# Patient Record
Sex: MALE | Race: WHITE | NOT HISPANIC OR LATINO | Employment: OTHER | URBAN - METROPOLITAN AREA
[De-identification: names, ages, dates, MRNs, and addresses within clinical notes are randomized per-mention and may not be internally consistent; named-entity substitution may affect disease eponyms.]

---

## 2019-09-04 ENCOUNTER — HISTORICAL (OUTPATIENT)
Dept: ADMINISTRATIVE | Facility: HOSPITAL | Age: 84
End: 2019-09-04

## 2020-07-16 ENCOUNTER — HISTORICAL (OUTPATIENT)
Dept: ADMINISTRATIVE | Facility: HOSPITAL | Age: 85
End: 2020-07-16

## 2021-06-29 ENCOUNTER — HISTORICAL (OUTPATIENT)
Dept: RADIOLOGY | Facility: HOSPITAL | Age: 86
End: 2021-06-29

## 2021-08-10 ENCOUNTER — HISTORICAL (OUTPATIENT)
Dept: RADIOLOGY | Facility: HOSPITAL | Age: 86
End: 2021-08-10

## 2022-04-09 ENCOUNTER — HISTORICAL (OUTPATIENT)
Dept: ADMINISTRATIVE | Facility: HOSPITAL | Age: 87
End: 2022-04-09

## 2022-04-25 VITALS
BODY MASS INDEX: 28.98 KG/M2 | HEIGHT: 65 IN | DIASTOLIC BLOOD PRESSURE: 86 MMHG | WEIGHT: 173.94 LBS | SYSTOLIC BLOOD PRESSURE: 140 MMHG

## 2022-12-20 DIAGNOSIS — R74.8 ACID PHOSPHATASE ELEVATED: Primary | ICD-10-CM

## 2022-12-23 ENCOUNTER — HOSPITAL ENCOUNTER (OUTPATIENT)
Dept: RADIOLOGY | Facility: HOSPITAL | Age: 87
Discharge: HOME | End: 2022-12-23
Attending: FAMILY MEDICINE
Payer: MEDICARE

## 2022-12-23 DIAGNOSIS — R74.8 ACID PHOSPHATASE ELEVATED: ICD-10-CM

## 2022-12-23 PROCEDURE — 76705 ECHO EXAM OF ABDOMEN: CPT | Mod: TC

## 2023-11-29 ENCOUNTER — HOSPITAL ENCOUNTER (OUTPATIENT)
Dept: RADIOLOGY | Facility: HOSPITAL | Age: 88
Discharge: HOME | End: 2023-11-29
Attending: SURGERY
Payer: MEDICARE

## 2023-11-29 ENCOUNTER — OFFICE VISIT (OUTPATIENT)
Dept: SURGERY | Facility: CLINIC | Age: 88
End: 2023-11-29
Payer: MEDICARE

## 2023-11-29 VITALS
SYSTOLIC BLOOD PRESSURE: 144 MMHG | WEIGHT: 167 LBS | HEIGHT: 65 IN | BODY MASS INDEX: 27.82 KG/M2 | HEART RATE: 76 BPM | DIASTOLIC BLOOD PRESSURE: 84 MMHG

## 2023-11-29 DIAGNOSIS — Z01.818 PREOP EXAMINATION: Primary | ICD-10-CM

## 2023-11-29 DIAGNOSIS — D17.1 LIPOMA OF ANTERIOR CHEST WALL: Primary | ICD-10-CM

## 2023-11-29 DIAGNOSIS — Z01.818 PREOP EXAMINATION: ICD-10-CM

## 2023-11-29 PROCEDURE — 99203 OFFICE O/P NEW LOW 30 MIN: CPT | Mod: ,,, | Performed by: SURGERY

## 2023-11-29 PROCEDURE — 99203 PR OFFICE/OUTPT VISIT, NEW, LEVL III, 30-44 MIN: ICD-10-PCS | Mod: ,,, | Performed by: SURGERY

## 2023-11-29 PROCEDURE — 71045 X-RAY EXAM CHEST 1 VIEW: CPT | Mod: TC

## 2023-11-29 RX ORDER — ATORVASTATIN CALCIUM 40 MG/1
40 TABLET, FILM COATED ORAL
COMMUNITY
Start: 2023-09-11

## 2023-11-29 RX ORDER — AMOXICILLIN 500 MG
CAPSULE ORAL DAILY
COMMUNITY

## 2023-11-29 RX ORDER — UBIDECARENONE 30 MG
30 CAPSULE ORAL 3 TIMES DAILY
COMMUNITY

## 2023-11-29 RX ORDER — BROMPHENIRAMINE MALEATE, DEXTROMETHORPHAN HBR, PHENYLEPHRINE HCL, DIPHENHYDRAMINE HCL, PHENYLEPHRINE HCL 0.52G
0.52 KIT ORAL DAILY
Status: ON HOLD | COMMUNITY
End: 2023-12-14

## 2023-11-29 RX ORDER — DICLOFENAC SODIUM 10 MG/G
GEL TOPICAL
COMMUNITY
Start: 2023-10-06

## 2023-11-29 RX ORDER — NAPROXEN SODIUM 220 MG/1
81 TABLET, FILM COATED ORAL DAILY
COMMUNITY

## 2023-11-29 RX ORDER — SILDENAFIL 100 MG/1
100 TABLET, FILM COATED ORAL DAILY PRN
COMMUNITY

## 2023-11-30 DIAGNOSIS — M79.89 SOFT TISSUE MASS: Primary | ICD-10-CM

## 2023-11-30 RX ORDER — CEFAZOLIN SODIUM 2 G/50ML
2 SOLUTION INTRAVENOUS
Status: CANCELLED | OUTPATIENT
Start: 2023-11-30

## 2023-12-02 NOTE — PROGRESS NOTES
"HISTORY & PHYSICAL  General Surgery    Patient Name: Joanne Akers  YOB: 1935    Date: 12/02/2023                   PRESENTING HISTORY     Chief Complaint/Reason for Admission: Consult (Chest lipoma)       History of Present Illness:  Mr. Joanne Akers is a 88 y.o. male referred for evaluation of mass. He states he  reports a mass on his left chest that has been present for quite some time. It has continued to increase in size and is now causing some discomfort. Denies trauma. He denies chest pain, shortness of breath, fever or chills.    Review of Systems:  12 point ROS negative except as stated in HPI    PAST HISTORY:     Past Medical History:   Diagnosis Date    Diabetes mellitus     Hyperlipidemia     Lipoma     Premature ventricular complex     Prostate cancer      Past Surgical History:   Procedure Laterality Date    arm surgery      CATARACT EXTRACTION      COLONOSCOPY      EYE SURGERY      tongue suregry       Family History   Problem Relation Age of Onset    Diabetes Mother     Heart disease Mother     Heart disease Father      Social History     Socioeconomic History    Marital status:    Tobacco Use    Smoking status: Never     Passive exposure: Never    Smokeless tobacco: Never   Substance and Sexual Activity    Alcohol use: Never     Comment: occassionally       MEDICATIONS & ALLERGIES:     No current outpatient medications on file prior to visit.     No current facility-administered medications on file prior to visit.     Review of patient's allergies indicates:   Allergen Reactions    Codeine Other (See Comments)     "Makes him go crazy"       OBJECTIVE:   Visit Vitals  BP (!) 144/84 (BP Location: Left arm, Patient Position: Sitting)   Pulse 76   Ht 5' 5" (1.651 m)   Wt 75.8 kg (167 lb)   BMI 27.79 kg/m²       Physical Exam:  General:  Well developed, well nourished, no acute distress  HEENT:  Normocephalic, atraumatic, PERRL, EOMI, clear sclera, ears normal, neck supple, " throat clear without erythema or exudates  CVS:  RRR, S1 and S2 normal, no murmurs, rubs, gallops  Resp:  Lungs clear to auscultation, no wheezes, rales, rhonchi, cough  GI:  Abdomen soft, non-tender, non-distended, normoactive bowel sounds, no masses  :  Deferred  MSK:  No muscle atrophy, cyanosis, peripheral edema, full range of motion  Skin:  No rashes, ulcers, erythema. Left Anterior Chest - 10 cm soft tissue mass, NTTP, no erythema / edema  Neuro:  CNII-XII grossly intact  Psych:  Alert and oriented to person, place, and time    Results:  I have independently reviewed all pertinent lab and radiologic studies relevant to general/bariatric surgery.    VISIT DIAGNOSES:       ICD-10-CM ICD-9-CM   1. Lipoma of anterior chest wall  D17.1 214.8       ASSESSMENT/PLAN:     89 yo male with Soft Tissue Mass of Left Anterior Chest    - Plan: Excision of Soft Tissue Mass   - Preoperative workup

## 2023-12-05 NOTE — DISCHARGE INSTRUCTIONS
Patient Education  Surgical Wound Discharge Instructions     What care is needed at home?   Any cut made on the skin gives germs easy access to cause an infection. A wound infection can start from 2 days to 3 weeks after surgery. The infection may spread deeper in the body if it is not treated. You may start to feel unwell and serious health problems could happen. An infection may also cause the cut to open up again. These things may help you prevent an infection:  Wash your hands before and after touching your cut site. Use soap and water for at least 20 seconds. Alcohol-based hand sanitizers also work to kill germs.  Keep your wound clean and dry for the first 48 hours. You can take a shower after 48 hours or when your doctor tells you to. You may use soap and water to wash your wound. Make sure not to soak it. Gently towel-dry the wound afterwards. Your doctor may also use skin glue or special tape to close your wound. Do not take the glue or tape off. It will peel off within 7-14 days.   Take the medications your doctor may prescribe you as ordered.  Do not lift anything over 20 pounds for 4 weeks.   What follow-up care is needed?   Be sure to keep your follow-up appointment.  What lifestyle changes are needed?   Stop or lessen smoking.  Get lots of rest. Sleep when you are feeling tired. Avoid doing tiring activities.  Follow a healthy diet.  Eat many different foods rich in nutrients from all food groups.   Stay away from sugars and fats. Limit sweets and fatty foods such as desserts, fried foods, and chips. Use good fats found in fish, nuts, avocados, and oils, like olive oil and canola oil. Cut back on solid fats (butter, lard, margarine).  Try to eat more low fat or lean meats. Eat less red meat and eat more fish, chicken, turkey, and beans instead.  Drink 6 to 8 cups (1440 to 1920 mL) of water each day unless your doctor has told you to limit your fluids.  Avoid swimming and hot tubs until incision is  healed.  Will physical activity be limited?   Movement may be limited if your wound is in an area that you use a lot. These areas are more likely to break open. Take extra care if you have a wound on a place like your hand, elbow, or knee.  Avoid stretching and pulling activities. These may cause your scar to pull apart. Call your doctor if this happens.  Ask your doctor when it is safe for you to do things like run, work out, or play sports.   What problems could happen?   Infection of the whole body. This is sepsis.  Poorly healed wound may leave big scars.  The wound may spontaneously open or break away from the stitches or staples. This is wound dehiscence.   When do I need to call the doctor?   Signs of a very bad reaction. These include wheezing; chest tightness; fever; itching; bad cough; blue skin color; seizures; or swelling of face, lips, tongue, or throat. Go to the ER right away.  Signs of infection. These include a fever of 100.4°F (38°C) or higher, chills, wound that will not heal, pain.  Signs of wound infection. These include swelling, redness, warmth around the wound; too much pain when touched; yellowish, greenish, or bloody discharge; foul smell coming from the cut site; cut site opens up.  Helpful tips   Wear loose clothing. Good blood supply is important for healing. You may also be more comfortable.  Do not remove your bandages unless your doctor says so.  If your wound suddenly bleeds, apply pressure to help stop the bleeding. See you doctor right away.  If you have high blood sugar, work with your doctor to keep it in a normal range.

## 2023-12-14 ENCOUNTER — ANESTHESIA (OUTPATIENT)
Dept: SURGERY | Facility: HOSPITAL | Age: 88
End: 2023-12-14
Payer: MEDICARE

## 2023-12-14 ENCOUNTER — ANESTHESIA EVENT (OUTPATIENT)
Dept: SURGERY | Facility: HOSPITAL | Age: 88
End: 2023-12-14
Payer: MEDICARE

## 2023-12-14 ENCOUNTER — HOSPITAL ENCOUNTER (OUTPATIENT)
Facility: HOSPITAL | Age: 88
Discharge: HOME | End: 2023-12-14
Attending: SURGERY | Admitting: SURGERY
Payer: MEDICARE

## 2023-12-14 DIAGNOSIS — M79.89 SOFT TISSUE MASS: ICD-10-CM

## 2023-12-14 DIAGNOSIS — M79.89 MASS OF SOFT TISSUE OF CHEST: Primary | ICD-10-CM

## 2023-12-14 PROCEDURE — D9220A PRA ANESTHESIA: Mod: CRNA,,, | Performed by: NURSE ANESTHETIST, CERTIFIED REGISTERED

## 2023-12-14 PROCEDURE — 36000706: Performed by: SURGERY

## 2023-12-14 PROCEDURE — 71000033 HC RECOVERY, INTIAL HOUR: Performed by: SURGERY

## 2023-12-14 PROCEDURE — 21552 EXC NECK LES SC 3 CM/>: CPT | Mod: ,,, | Performed by: SURGERY

## 2023-12-14 PROCEDURE — 37000009 HC ANESTHESIA EA ADD 15 MINS: Performed by: SURGERY

## 2023-12-14 PROCEDURE — 21552 EXC NECK LES SC 3 CM/>: CPT | Mod: AS,,,

## 2023-12-14 PROCEDURE — 63600175 PHARM REV CODE 636 W HCPCS: Performed by: NURSE ANESTHETIST, CERTIFIED REGISTERED

## 2023-12-14 PROCEDURE — D9220A PRA ANESTHESIA: Mod: ANES,,, | Performed by: ANESTHESIOLOGY

## 2023-12-14 PROCEDURE — 63600175 PHARM REV CODE 636 W HCPCS: Performed by: SURGERY

## 2023-12-14 PROCEDURE — 25000003 PHARM REV CODE 250: Performed by: NURSE ANESTHETIST, CERTIFIED REGISTERED

## 2023-12-14 PROCEDURE — D9220A PRA ANESTHESIA: ICD-10-PCS | Mod: CRNA,,, | Performed by: NURSE ANESTHETIST, CERTIFIED REGISTERED

## 2023-12-14 PROCEDURE — 88304 TISSUE EXAM BY PATHOLOGIST: CPT | Performed by: SURGERY

## 2023-12-14 PROCEDURE — 21552 PR EXC TUMOR SOFT TISSUE NECK/ANT THORAX SUBQ 3+CM: ICD-10-PCS | Mod: AS,,,

## 2023-12-14 PROCEDURE — 27201423 OPTIME MED/SURG SUP & DEVICES STERILE SUPPLY: Performed by: SURGERY

## 2023-12-14 PROCEDURE — 36000707: Performed by: SURGERY

## 2023-12-14 PROCEDURE — D9220A PRA ANESTHESIA: ICD-10-PCS | Mod: ANES,,, | Performed by: ANESTHESIOLOGY

## 2023-12-14 PROCEDURE — 71000016 HC POSTOP RECOV ADDL HR: Performed by: SURGERY

## 2023-12-14 PROCEDURE — 71000015 HC POSTOP RECOV 1ST HR: Performed by: SURGERY

## 2023-12-14 PROCEDURE — 63600175 PHARM REV CODE 636 W HCPCS: Performed by: ANESTHESIOLOGY

## 2023-12-14 PROCEDURE — 37000008 HC ANESTHESIA 1ST 15 MINUTES: Performed by: SURGERY

## 2023-12-14 PROCEDURE — 21552 PR EXC TUMOR SOFT TISSUE NECK/ANT THORAX SUBQ 3+CM: ICD-10-PCS | Mod: ,,, | Performed by: SURGERY

## 2023-12-14 RX ORDER — FENTANYL CITRATE 50 UG/ML
INJECTION, SOLUTION INTRAMUSCULAR; INTRAVENOUS
Status: DISCONTINUED | OUTPATIENT
Start: 2023-12-14 | End: 2023-12-14

## 2023-12-14 RX ORDER — CEFAZOLIN SODIUM 1 G/3ML
2 INJECTION, POWDER, FOR SOLUTION INTRAMUSCULAR; INTRAVENOUS
Status: DISCONTINUED | OUTPATIENT
Start: 2023-12-14 | End: 2023-12-14 | Stop reason: HOSPADM

## 2023-12-14 RX ORDER — BUPIVACAINE HYDROCHLORIDE 2.5 MG/ML
INJECTION, SOLUTION EPIDURAL; INFILTRATION; INTRACAUDAL
Status: DISCONTINUED | OUTPATIENT
Start: 2023-12-14 | End: 2023-12-14 | Stop reason: HOSPADM

## 2023-12-14 RX ORDER — MEPERIDINE HYDROCHLORIDE 25 MG/ML
50 INJECTION INTRAMUSCULAR; INTRAVENOUS; SUBCUTANEOUS
Status: DISCONTINUED | OUTPATIENT
Start: 2023-12-14 | End: 2023-12-14 | Stop reason: HOSPADM

## 2023-12-14 RX ORDER — TRAMADOL HYDROCHLORIDE 50 MG/1
50 TABLET ORAL EVERY 6 HOURS PRN
Qty: 20 TABLET | Refills: 0 | Status: SHIPPED | OUTPATIENT
Start: 2023-12-14

## 2023-12-14 RX ORDER — HYDROMORPHONE HYDROCHLORIDE 2 MG/ML
0.2 INJECTION, SOLUTION INTRAMUSCULAR; INTRAVENOUS; SUBCUTANEOUS EVERY 5 MIN PRN
Status: ACTIVE | OUTPATIENT
Start: 2023-12-14 | End: 2023-12-14

## 2023-12-14 RX ORDER — SODIUM CHLORIDE 0.9 % (FLUSH) 0.9 %
10 SYRINGE (ML) INJECTION
Status: DISCONTINUED | OUTPATIENT
Start: 2023-12-14 | End: 2023-12-14 | Stop reason: HOSPADM

## 2023-12-14 RX ORDER — HYDROCODONE BITARTRATE AND ACETAMINOPHEN 7.5; 325 MG/1; MG/1
1 TABLET ORAL EVERY 6 HOURS PRN
Status: DISCONTINUED | OUTPATIENT
Start: 2023-12-14 | End: 2023-12-14 | Stop reason: HOSPADM

## 2023-12-14 RX ORDER — LIDOCAINE HYDROCHLORIDE 10 MG/ML
INJECTION, SOLUTION EPIDURAL; INFILTRATION; INTRACAUDAL; PERINEURAL
Status: DISCONTINUED | OUTPATIENT
Start: 2023-12-14 | End: 2023-12-14

## 2023-12-14 RX ORDER — PROPOFOL 10 MG/ML
VIAL (ML) INTRAVENOUS
Status: DISCONTINUED | OUTPATIENT
Start: 2023-12-14 | End: 2023-12-14

## 2023-12-14 RX ORDER — SODIUM CITRATE AND CITRIC ACID MONOHYDRATE 334; 500 MG/5ML; MG/5ML
30 SOLUTION ORAL
Status: CANCELLED | OUTPATIENT
Start: 2023-12-14

## 2023-12-14 RX ORDER — ONDANSETRON 2 MG/ML
4 INJECTION INTRAMUSCULAR; INTRAVENOUS EVERY 4 HOURS PRN
Status: DISCONTINUED | OUTPATIENT
Start: 2023-12-14 | End: 2023-12-14 | Stop reason: HOSPADM

## 2023-12-14 RX ORDER — ONDANSETRON HYDROCHLORIDE 2 MG/ML
INJECTION, SOLUTION INTRAMUSCULAR; INTRAVENOUS
Status: DISCONTINUED | OUTPATIENT
Start: 2023-12-14 | End: 2023-12-14

## 2023-12-14 RX ORDER — METHOCARBAMOL 100 MG/ML
500 INJECTION, SOLUTION INTRAMUSCULAR; INTRAVENOUS ONCE
Status: COMPLETED | OUTPATIENT
Start: 2023-12-14 | End: 2023-12-14

## 2023-12-14 RX ORDER — SODIUM CHLORIDE, SODIUM GLUCONATE, SODIUM ACETATE, POTASSIUM CHLORIDE AND MAGNESIUM CHLORIDE 30; 37; 368; 526; 502 MG/100ML; MG/100ML; MG/100ML; MG/100ML; MG/100ML
INJECTION, SOLUTION INTRAVENOUS CONTINUOUS
Status: DISCONTINUED | OUTPATIENT
Start: 2023-12-14 | End: 2023-12-14 | Stop reason: HOSPADM

## 2023-12-14 RX ORDER — SODIUM CHLORIDE, SODIUM LACTATE, POTASSIUM CHLORIDE, CALCIUM CHLORIDE 600; 310; 30; 20 MG/100ML; MG/100ML; MG/100ML; MG/100ML
INJECTION, SOLUTION INTRAVENOUS CONTINUOUS
Status: ACTIVE | OUTPATIENT
Start: 2023-12-14

## 2023-12-14 RX ORDER — TRAMADOL HYDROCHLORIDE 50 MG/1
50 TABLET ORAL EVERY 4 HOURS PRN
Status: DISCONTINUED | OUTPATIENT
Start: 2023-12-14 | End: 2023-12-14 | Stop reason: HOSPADM

## 2023-12-14 RX ORDER — LIDOCAINE HYDROCHLORIDE 10 MG/ML
1 INJECTION, SOLUTION EPIDURAL; INFILTRATION; INTRACAUDAL; PERINEURAL ONCE
Status: DISCONTINUED | OUTPATIENT
Start: 2023-12-14 | End: 2023-12-14 | Stop reason: HOSPADM

## 2023-12-14 RX ORDER — BUPIVACAINE HYDROCHLORIDE 2.5 MG/ML
INJECTION, SOLUTION EPIDURAL; INFILTRATION; INTRACAUDAL
Status: DISCONTINUED
Start: 2023-12-14 | End: 2023-12-14 | Stop reason: HOSPADM

## 2023-12-14 RX ADMIN — PROPOFOL 100 MG: 10 INJECTION, EMULSION INTRAVENOUS at 01:12

## 2023-12-14 RX ADMIN — METHOCARBAMOL 500 MG: 100 INJECTION INTRAMUSCULAR; INTRAVENOUS at 02:12

## 2023-12-14 RX ADMIN — SODIUM CHLORIDE, POTASSIUM CHLORIDE, SODIUM LACTATE AND CALCIUM CHLORIDE: 600; 310; 30; 20 INJECTION, SOLUTION INTRAVENOUS at 01:12

## 2023-12-14 RX ADMIN — SODIUM CHLORIDE, POTASSIUM CHLORIDE, SODIUM LACTATE AND CALCIUM CHLORIDE: 600; 310; 30; 20 INJECTION, SOLUTION INTRAVENOUS at 12:12

## 2023-12-14 RX ADMIN — CEFAZOLIN 2 G: 330 INJECTION, POWDER, FOR SOLUTION INTRAMUSCULAR; INTRAVENOUS at 01:12

## 2023-12-14 RX ADMIN — SODIUM CHLORIDE, POTASSIUM CHLORIDE, SODIUM LACTATE AND CALCIUM CHLORIDE: 600; 310; 30; 20 INJECTION, SOLUTION INTRAVENOUS at 02:12

## 2023-12-14 RX ADMIN — ONDANSETRON 4 MG: 2 INJECTION INTRAMUSCULAR; INTRAVENOUS at 01:12

## 2023-12-14 RX ADMIN — FENTANYL CITRATE 50 MCG: 50 INJECTION, SOLUTION INTRAMUSCULAR; INTRAVENOUS at 01:12

## 2023-12-14 RX ADMIN — LIDOCAINE HYDROCHLORIDE 40 MG: 10 INJECTION, SOLUTION EPIDURAL; INFILTRATION; INTRACAUDAL; PERINEURAL at 01:12

## 2023-12-14 RX ADMIN — PROPOFOL 50 MG: 10 INJECTION, EMULSION INTRAVENOUS at 01:12

## 2023-12-14 NOTE — ANESTHESIA PROCEDURE NOTES
Intubation    Date/Time: 12/14/2023 1:21 PM    Performed by: Mireya Linn CRNA  Authorized by: Jose Alejandro Fajardo MD    Intubation:     Induction:  Intravenous    Intubated:  Postinduction    Mask Ventilation:  Easy mask    Attempts:  1    Attempted By:  CRNA    Difficult Airway Encountered?: No      Airway Device:  Supraglottic airway/LMA    Airway Device Size:  4.0    Style/Cuff Inflation:  Cuffed (inflated to minimal occlusive pressure)    Inflation Amount (mL):  18    Placement Verified By:  Capnometry    Complicating Factors:  None    Findings Post-Intubation:  BS equal bilateral      
Improved

## 2023-12-14 NOTE — CARE UPDATE
Patient in room with wife at bedside and call bell in reach. He denies any c/o, he was given apple juice and orlin crackers.

## 2023-12-14 NOTE — TRANSFER OF CARE
"Anesthesia Transfer of Care Note    Patient: Joanne Akers    Procedure(s) Performed: Procedure(s) (LRB):  EXCISION, MASS // excision STM left chest (Left)    Patient location: PACU    Anesthesia Type: general    Transport from OR: Transported from OR on room air with adequate spontaneous ventilation    Post pain: adequate analgesia    Post assessment: no apparent anesthetic complications and tolerated procedure well    Post vital signs: stable    Level of consciousness: sedated    Nausea/Vomiting: no nausea/vomiting    Complications: none    Transfer of care protocol was followed      Last vitals: Visit Vitals  BP (!) 166/77   Pulse 80   Temp 36.6 °C (97.9 °F) (Oral)   Resp 18   Ht 5' 5" (1.651 m)   Wt 72.6 kg (160 lb)   SpO2 99%   BMI 26.63 kg/m²     "

## 2023-12-14 NOTE — CARE UPDATE
Received patient from the OR, he is arousing upon pacu arrival,ríos, rejected oral airway, oriented/reassured him,denied c/o, respirations full-regular-deep-clear,hob up 30 degrees.

## 2023-12-14 NOTE — ANESTHESIA PREPROCEDURE EVALUATION
12/14/2023  Joanne Akers is a 88 y.o., male, essentially healthy, here for superficial chest wall lump removal      Pre-op Assessment    I have reviewed the Patient Summary Reports.     I have reviewed the Nursing Notes. I have reviewed the NPO Status.   I have reviewed the Medications.     Review of Systems  Cardiovascular:  Exercise tolerance: good                                               Physical Exam  General: Well nourished and Cooperative    Airway:  Mallampati: II   Mouth Opening: Normal  TM Distance: Normal  Tongue: Normal  Neck ROM: Normal ROM    Dental:  Partial Dentures    Chest/Lungs:  Clear to auscultation    Heart:  Rhythm: Regular Rhythm        Anesthesia Plan  Type of Anesthesia, risks & benefits discussed:    Anesthesia Type: Gen Supraglottic Airway  Intra-op Monitoring Plan: Standard ASA Monitors  Post Op Pain Control Plan: multimodal analgesia  Induction:  IV  Informed Consent: Informed consent signed with the Patient and all parties understand the risks and agree with anesthesia plan.  All questions answered.   ASA Score: 2  Day of Surgery Review of History & Physical: H&P Update referred to the surgeon/provider.    Ready For Surgery From Anesthesia Perspective.     .I explained anesthesia plan to patient/responsbile party if available.  Anesthesia consent done going over the material facts, risks, complications & alternatives, obtained which includes the possibility of altering the anesthesia plan.  I reviewed problem list, prior to admission medication list, appropriate labs, any workup, Xray, EKG etc noted below.  Patients condition is satisfactory to proceed with anesthesia plan unless otherwise noted (see anesthesia chart for details of the anesthesia plan carried out).      Pre-operative evaluation for Procedure(s) (LRB):  EXCISION, MASS // excision STM left chest  "(Left)    BP (!) 166/77   Pulse 80   Temp 36.6 °C (97.9 °F) (Oral)   Ht 5' 5" (1.651 m)   Wt 72.6 kg (160 lb)   SpO2 99%   BMI 26.63 kg/m²     There is no problem list on file for this patient.      Review of patient's allergies indicates:   Allergen Reactions    Codeine Other (See Comments)     "Makes him go crazy"       Current Outpatient Medications   Medication Instructions    aspirin 81 mg, Oral, Daily    atorvastatin (LIPITOR) 40 mg, Oral    co-enzyme Q-10 30 mg, Oral, 3 times daily    omega-3 fatty acids/fish oil (FISH OIL-OMEGA-3 FATTY ACIDS) 300-1,000 mg capsule Oral, Daily    sildenafiL (VIAGRA) 100 mg, Oral, Daily PRN    VOLTAREN ARTHRITIS PAIN 1 % Gel SMARTSI Gram(s) Topical 4 Times Daily PRN       Past Surgical History:   Procedure Laterality Date    arm surgery      left elbow    CATARACT EXTRACTION      COLONOSCOPY      EYE SURGERY      tongue suregry         Social History     Socioeconomic History    Marital status:    Tobacco Use    Smoking status: Never     Passive exposure: Never    Smokeless tobacco: Never   Substance and Sexual Activity    Alcohol use: Never     Comment: occassionally    Drug use: Never       Lab Results   Component Value Date    WBC 8.10 2023    HGB 16.8 2023    HCT 49.7 2023    .0 (H) 2023     2023          BMP  Lab Results   Component Value Date    HCT 49.7 2023     2023    K 3.8 2023    BUN 11.1 2023    CREATININE 0.86 2023    CALCIUM 9.0 2023        INR  No results for input(s): "PT", "INR", "PROTIME", "APTT" in the last 72 hours.        Diagnostic Studies:      EKG:  Results for orders placed or performed in visit on 23   EKG 12-lead    Collection Time: 23  3:19 PM    Narrative    Test Reason : Z01.818,    Vent. Rate : 071 BPM     Atrial Rate : 071 BPM     P-R Int : 142 ms          QRS Dur : 076 ms      QT Int : 376 ms       P-R-T Axes : 050 067 047 degrees    "  QTc Int : 408 ms    Normal sinus rhythm  Normal ECG  No previous ECGs available  Confirmed by Brock BENJAMIN, Scar ([id number]) on 11/29/2023 10:21:12 PM    Referred By: KATY GODOY JR.           Confirmed By:Scar Gutiérrez MD

## 2023-12-14 NOTE — OP NOTE
PATIENT:  Joanne Akers      : 1935       DATE OF SURGERY:   2023          SURGEON:  Joseph Soriano MD       ASSISTANT:  Diamante Mckeon NP          PREOPERATIVE DIAGNOSIS:  Soft Tissue Mass - Left Chest ~ 10 cm           POSTOPERATIVE DIAGNOSIS:  Same.           OPERATIONS:  Excision of Soft Tissue Mass           Anesthesia: LMA / Local      Estimated blood loss: Minimal (< 20)     Blood administered:  None.      Lap and instrument counts correct x 2 at the end of the case.           SPECIMEN: Soft tissue Mass approximately 10 cm           INDICATIONS/SIGNIFICANT HISTORY:  The patient is a 88 y.o. year old male who seen with complaints of a soft tissue mass on his Left Chest.   Risks and Benefits of surgical excision was discussed with the patient, who voiced understanding of risks and benefits and elected to proceed with surgery.           PROCEDURE IN DETAIL:  Once informed consents were obtained, the patient was taken to the operating room and placed on the operating table.  After anesthesia was induced, the marked area was prepped and draped in a standard surgical fashion.  Local anesthesia was obtained with 0.25% marcaine with epinephrine.  An incision was made around the soft tissue mass and it was dissected with blunt and sharp dissection and the mass was passed off the table as a specimen.  The wound was then irrigated and dried and bleeding stopped with cautery.  The skin was then close with suture.  The wounds were cleaned and dried and steri-strips were applied.  The patient tolerated the procedure well and was transported to recovery room in good condition.

## 2023-12-14 NOTE — DISCHARGE SUMMARY
[facility] Surgical - Periop Services  Discharge Note  Short Stay    Procedure(s) (LRB):  EXCISION, MASS // excision STM left chest (Left)      OUTCOME: Patient tolerated treatment/procedure well without complication and is now ready for discharge.    DISPOSITION: Home or Self Care    FINAL DIAGNOSIS:  Soft tissue mass    FOLLOWUP: In clinic    DISCHARGE INSTRUCTIONS:  No discharge procedures on file.     TIME SPENT ON DISCHARGE:      minutes

## 2023-12-15 VITALS
OXYGEN SATURATION: 98 % | WEIGHT: 160 LBS | SYSTOLIC BLOOD PRESSURE: 134 MMHG | HEART RATE: 77 BPM | BODY MASS INDEX: 26.66 KG/M2 | DIASTOLIC BLOOD PRESSURE: 80 MMHG | RESPIRATION RATE: 20 BRPM | HEIGHT: 65 IN | TEMPERATURE: 98 F

## 2023-12-15 NOTE — ANESTHESIA POSTPROCEDURE EVALUATION
Anesthesia Post Evaluation    Patient: Joanne Akers    Procedure(s) Performed: Procedure(s) (LRB):  EXCISION, MASS // excision STM left chest (Left)    Final Anesthesia Type: general (/Regional//MAC)      Patient location during evaluation: PACU  Post-procedure mental status: @ basline.  Pain management: adequate    PONV status: See postop meds for drugs used to control n/v if any.  Anesthetic complications: no      Cardiovascular status: blood pressure returned to baseline  Respiratory status: @ baseline.  Hydration status: euvolemic                Vitals Value Taken Time   /80 12/14/23 1545   Temp 36.8 °C (98.2 °F) 12/14/23 1530   Pulse 77 12/14/23 1545   Resp 20 12/14/23 1545   SpO2 98 % 12/14/23 1545         Event Time   Out of Recovery 14:38:00         Pain/Romana Score: Romana Score: 9 (12/14/2023  3:30 PM)  Modified Romana Score: 19 (12/14/2023  3:30 PM)

## 2023-12-18 LAB — PSYCHE PATHOLOGY RESULT: NORMAL

## 2023-12-27 ENCOUNTER — OFFICE VISIT (OUTPATIENT)
Dept: SURGERY | Facility: CLINIC | Age: 88
End: 2023-12-27
Payer: MEDICARE

## 2023-12-27 VITALS
SYSTOLIC BLOOD PRESSURE: 127 MMHG | HEIGHT: 65 IN | DIASTOLIC BLOOD PRESSURE: 78 MMHG | WEIGHT: 168.63 LBS | BODY MASS INDEX: 28.1 KG/M2 | HEART RATE: 73 BPM

## 2023-12-27 DIAGNOSIS — Z98.890 POST-OPERATIVE STATE: Primary | ICD-10-CM

## 2023-12-27 PROCEDURE — 99024 POSTOP FOLLOW-UP VISIT: CPT | Mod: POP,,,

## 2023-12-27 PROCEDURE — 99024 PR POST-OP FOLLOW-UP VISIT: ICD-10-PCS | Mod: POP,,,

## 2023-12-27 NOTE — PROGRESS NOTES
Post Operative Progress Note  General Surgery    Patient Name: Joanne Akers  YOB: 1935    Date: 2023                   SUBJECTIVE:     Chief Complaint/Reason for Admission:   Chief Complaint   Patient presents with    Post-op Evaluation     EXC of [facility] 23        History of Present Illness:  Mr. Joanne Akers is a 88 y.o. male who is 2 weeks post op excision of soft tissue mass from left chest.  The patient is currently without any complaints.    Review of Systems:  12 point ROS negative except as stated in HPI    PAST HISTORY:     Past Medical History:   Diagnosis Date    Hyperlipidemia     Lipoma     Premature ventricular complex     Prostate cancer      Past Surgical History:   Procedure Laterality Date    arm surgery      left elbow    CATARACT EXTRACTION      COLONOSCOPY      EXCISION OF MASS OF CHEST Left 2023    Procedure: EXCISION, MASS // excision [facility] left chest;  Surgeon: Joseph Soriano Jr., MD;  Location: [facility];  Service: General;  Laterality: Left;    EYE SURGERY      tongue suregry       Family History   Problem Relation Age of Onset    Diabetes Mother     Heart disease Mother     Heart disease Father      Social History     Socioeconomic History    Marital status:    Tobacco Use    Smoking status: Never     Passive exposure: Never    Smokeless tobacco: Never   Substance and Sexual Activity    Alcohol use: Yes     Comment: occassionally    Drug use: Never       MEDICATIONS & ALLERGIES:     Current Outpatient Medications on File Prior to Visit   Medication Sig    atorvastatin (LIPITOR) 40 MG tablet Take 40 mg by mouth.    co-enzyme Q-10 30 mg capsule Take 30 mg by mouth 3 (three) times daily.    omega-3 fatty acids/fish oil (FISH OIL-OMEGA-3 FATTY ACIDS) 300-1,000 mg capsule Take by mouth once daily.    sildenafiL (VIAGRA) 100 MG tablet Take 100 mg by mouth daily as needed for Erectile Dysfunction.    VOLTAREN ARTHRITIS PAIN 1 % Gel SMARTSI Gram(s)  "Topical 4 Times Daily PRN    aspirin 81 MG Chew Take 81 mg by mouth once daily.    traMADoL (ULTRAM) 50 mg tablet Take 1 tablet (50 mg total) by mouth every 6 (six) hours as needed for Pain. (Patient not taking: Reported on 12/27/2023)     Current Facility-Administered Medications on File Prior to Visit   Medication    lactated ringers infusion     Review of patient's allergies indicates:   Allergen Reactions    Codeine Other (See Comments)     "Makes him go crazy"       OBJECTIVE:   Visit Vitals  /78 (BP Location: Left arm, Patient Position: Sitting)   Pulse 73   Ht 5' 5" (1.651 m)   Wt 76.5 kg (168 lb 9.6 oz)   BMI 28.06 kg/m²       Physical Exam:  General:  Well developed, well nourished, no acute distress  GI:  Abdomen soft, non-tender, non-distended, normoactive bowel sounds, no masses  Skin:  Incision Clean/Dry/Intact    VISIT DIAGNOSES:       ICD-10-CM ICD-9-CM   1. Post-operative state  Z98.890 V45.89       ASSESSMENT/PLAN:     88 y.o. male who is s/p excision soft tissue mass    -  Pt doing well  -  Wounds healing well  -  Benign path    RTC PRN        "

## 2024-06-11 DIAGNOSIS — C61 MALIGNANT NEOPLASM OF PROSTATE: Primary | ICD-10-CM

## 2024-06-11 DIAGNOSIS — R97.21 INCREASING PROSTATE SPECIFIC ANTIGEN (PSA) LEVEL AFTER TREATMENT FOR MALIGNANT NEOPLASM OF PROSTATE: ICD-10-CM

## 2024-06-21 ENCOUNTER — HOSPITAL ENCOUNTER (OUTPATIENT)
Dept: RADIOLOGY | Facility: HOSPITAL | Age: 89
Discharge: HOME | End: 2024-06-21
Attending: SPECIALIST
Payer: MEDICARE

## 2024-06-21 DIAGNOSIS — R97.21 INCREASING PROSTATE SPECIFIC ANTIGEN (PSA) LEVEL AFTER TREATMENT FOR MALIGNANT NEOPLASM OF PROSTATE: ICD-10-CM

## 2024-06-21 DIAGNOSIS — C61 MALIGNANT NEOPLASM OF PROSTATE: ICD-10-CM

## 2024-06-21 PROCEDURE — A9596 HC GALLIUM GA-68 GOZETOTIDE, DX (ILLUCCIX), PER 1 MCI: HCPCS | Mod: TB | Performed by: SPECIALIST

## 2024-06-21 PROCEDURE — 78815 PET IMAGE W/CT SKULL-THIGH: CPT | Mod: TC

## 2024-06-21 RX ADMIN — KIT FOR THE PREPARATION OF GALLIUM GA 68 GOZETOTIDE INJECTION 5.5 MILLICURIE: KIT INTRAVENOUS at 01:06

## (undated) DEVICE — DRAPE TOP 53X102IN

## (undated) DEVICE — ELECTRODE PATIENT RETURN DISP

## (undated) DEVICE — SUPPORT ULNA NERVE PROTECTOR

## (undated) DEVICE — NDL SYR 10ML 18X1.5 LL BLUNT

## (undated) DEVICE — SUT VICRYL 3-0 27 SH

## (undated) DEVICE — ADHESIVE MASTISOL VIAL 48/BX

## (undated) DEVICE — Device

## (undated) DEVICE — SOL NACL IRR 1000ML BTL

## (undated) DEVICE — GLOVE PROTEXIS LTX MICRO 6.5

## (undated) DEVICE — GLOVE SIGNATURE MICRO LTX 6.5

## (undated) DEVICE — SUT VICRYL PLUS 4-0 FS-2 27IN

## (undated) DEVICE — DRAPE UTILITY W/ TAPE 20X30IN

## (undated) DEVICE — CLOSURE SKIN STERI STRIP 1/2X4

## (undated) DEVICE — GLOVE SENSICARE PI GRN 6.5

## (undated) DEVICE — GLOVE PROTEXIS LTX MICRO  7.5

## (undated) DEVICE — SEALANT VISTASEAL FIBRIN 2ML